# Patient Record
Sex: MALE | Race: WHITE | NOT HISPANIC OR LATINO | URBAN - METROPOLITAN AREA
[De-identification: names, ages, dates, MRNs, and addresses within clinical notes are randomized per-mention and may not be internally consistent; named-entity substitution may affect disease eponyms.]

---

## 2022-11-03 ENCOUNTER — EMERGENCY (EMERGENCY)
Facility: HOSPITAL | Age: 72
LOS: 1 days | Discharge: HOME | End: 2022-11-03
Attending: EMERGENCY MEDICINE | Admitting: EMERGENCY MEDICINE

## 2022-11-03 VITALS
TEMPERATURE: 98 F | SYSTOLIC BLOOD PRESSURE: 110 MMHG | OXYGEN SATURATION: 96 % | RESPIRATION RATE: 18 BRPM | DIASTOLIC BLOOD PRESSURE: 62 MMHG | HEART RATE: 50 BPM

## 2022-11-03 VITALS
SYSTOLIC BLOOD PRESSURE: 131 MMHG | RESPIRATION RATE: 18 BRPM | OXYGEN SATURATION: 96 % | HEART RATE: 53 BPM | DIASTOLIC BLOOD PRESSURE: 69 MMHG | WEIGHT: 192.02 LBS | TEMPERATURE: 98 F

## 2022-11-03 DIAGNOSIS — Z23 ENCOUNTER FOR IMMUNIZATION: ICD-10-CM

## 2022-11-03 DIAGNOSIS — S01.81XA LACERATION WITHOUT FOREIGN BODY OF OTHER PART OF HEAD, INITIAL ENCOUNTER: ICD-10-CM

## 2022-11-03 DIAGNOSIS — R51.9 HEADACHE, UNSPECIFIED: ICD-10-CM

## 2022-11-03 DIAGNOSIS — Z88.8 ALLERGY STATUS TO OTHER DRUGS, MEDICAMENTS AND BIOLOGICAL SUBSTANCES: ICD-10-CM

## 2022-11-03 DIAGNOSIS — I48.0 PAROXYSMAL ATRIAL FIBRILLATION: ICD-10-CM

## 2022-11-03 DIAGNOSIS — S02.2XXA FRACTURE OF NASAL BONES, INITIAL ENCOUNTER FOR CLOSED FRACTURE: ICD-10-CM

## 2022-11-03 DIAGNOSIS — S01.21XA LACERATION WITHOUT FOREIGN BODY OF NOSE, INITIAL ENCOUNTER: ICD-10-CM
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
## 2022-11-03 PROCEDURE — 99284 EMERGENCY DEPT VISIT MOD MDM: CPT

## 2022-11-03 PROCEDURE — 70486 CT MAXILLOFACIAL W/O DYE: CPT | Mod: 26,MH

## 2022-11-03 PROCEDURE — 70450 CT HEAD/BRAIN W/O DYE: CPT | Mod: 26,MH

## 2022-11-03 RX ORDER — TETANUS TOXOID, REDUCED DIPHTHERIA TOXOID AND ACELLULAR PERTUSSIS VACCINE, ADSORBED 5; 2.5; 8; 8; 2.5 [IU]/.5ML; [IU]/.5ML; UG/.5ML; UG/.5ML; UG/.5ML
0.5 SUSPENSION INTRAMUSCULAR ONCE
Refills: 0 | Status: COMPLETED | OUTPATIENT
Start: 2022-11-03 | End: 2022-11-03

## 2022-11-03 RX ADMIN — TETANUS TOXOID, REDUCED DIPHTHERIA TOXOID AND ACELLULAR PERTUSSIS VACCINE, ADSORBED 0.5 MILLILITER(S): 5; 2.5; 8; 8; 2.5 SUSPENSION INTRAMUSCULAR at 21:13

## 2022-11-03 RX ADMIN — Medication 1 TABLET(S): at 23:24

## 2022-11-03 NOTE — ED PROVIDER NOTE - NSFOLLOWUPINSTRUCTIONS_ED_ALL_ED_FT
A nasal fracture is a break or crack in the bones of the nose or the tissue that helps to form the nose (cartilage). Minor breaks do not require treatment and usually heal on their own after about one month. Serious breaks may require treatment that could include surgery.    What are the causes?  A nasal fracture is usually caused by the strong force of a direct hit to the nose (blunt injury). This type of injury often occurs from:    Playing a contact sport.  Being involved in a car accident.  Falling.  Getting punched.    What are the signs or symptoms?  Symptoms of this condition include:    Pain.  Swelling of the nose.  Bleeding from the nose.  Bruising around the nose or bruising around the eyes (black eyes).  Crooked appearance of the nose.    How is this diagnosed?  This condition may be diagnosed based on a physical exam. During the exam, the health care provider will:    Gently feel the nose for signs of broken bones.  Look inside the nostrils to check if there is a blood-filled swelling on the dividing wall between the nostrils (septal hematoma).    An X-ray of the nose may be taken. Sometimes, an X-ray may not show a nasal fracture even when one is present. In some cases, X-rays or a CT scan may be taken again 1–5 days later after the swelling has gone down.    How is this treated?  Treatment for this condition depends on the severity of the injury.    Minor fractures that have not caused deformity often do not require treatment.  For more serious fractures that have caused bones to move out of position, treatment may involve one of the following:    Repositioning the bones without surgery. The health care provider may be able to do this in his or her office after you are given medicine to numb the nasal area (local anesthetic).  Surgery. If surgery is needed, it will be done after the swelling is gone. Surgery will stabilize and align the fracture.    Follow these instructions at home:          Activity    Return to your normal activities as told by your health care provider. Ask your health care provider what activities are safe for you.  Avoid contact sports for 3–4 weeks or as told by your health care provider.        General instructions    If directed, put ice on the injured area:    Put ice in a plastic bag.  Place a towel between your skin and the bag.  Leave the ice on for 20 minutes, 2–3 times a day.  Take over-the-counter and prescription medicines only as told by your health care provider.  If your nose starts to bleed, sit in an upright position while you squeeze the soft parts of your nose against the dividing wall between your nostrils (septum) for 10 minutes.  Try to avoid blowing your nose.  Keep all follow-up visits as told by your health care provider. This is important.    Contact a health care provider if:  Your pain increases or becomes severe.  You continue to have nosebleeds.  The shape of your nose does not return to normal within 5 days.  You have pus draining out of your nose.    Get help right away if:  You have bleeding from your nose that does not stop after you pinch your nostrils closed for 20 minutes and keep ice on your nose.  You have clear fluid draining out of your nose.  You notice swelling near the septum inside the nose. This swelling is a septal hematoma that must be drained to help prevent infection.  You have difficulty moving your eyes.  You have repeated vomiting.    Summary  A nasal fracture is a break or crack in the bones or cartilage of the nose.  The fracture is usually caused by a blunt injury to the nose.  Symptoms include pain, swelling, and facial bruising.  Nasal fractures may heal on their own, or your health care provider may need to move the bones back into proper position. In some cases, surgery may be needed.    Some cuts, wounds, lacerations, and incisions can be repaired by using tissue adhesive, also called skin glue. It holds the skin together so healing can happen faster. It forms a strong bond on the skin in about 1 minute, and it reaches its full strength in about 2–3 minutes. The adhesive disappears naturally while the wound is healing. It is important to take good care of your wound at home while it heals.    Follow these instructions at home:      Wound care      If a bandage (dressing) has been applied, keep it clean and dry.  Follow instructions from your health care provider about how often to change the dressing.    Wash your hands with soap and water for at least 20 seconds before and after you change your dressing. If soap and water are not available, use hand .  Change your dressing as told by your health care provider.  Leave tissue adhesive in place. It will come off naturally after 7–10 days.  Do not scratch, rub, or pick at the adhesive.  Do not place tape over the adhesive. The adhesive could come off the wound when you pull the tape off.  Protect the wound from further injury until it is healed.  Check your wound area every day for signs of infection. Check for:    More redness, swelling, or pain.  Fluid or blood.  Warmth.  Pus or a bad smell.        Bathing    Do not take baths, swim, or use a hot tub until your health care provider approves. You may only be allowed to take sponge baths. Ask your health care provider if you may take showers.    You can usually shower after the first 24 hours.  Cover the dressing with a watertight covering when you take a shower.  Do not soak the area where there is tissue adhesive.  Do not use any soaps, petroleum jelly products, or ointments on the wound. Certain ointments can weaken the adhesive.        Eating and drinking    Eat a diet that includes protein, vitamin A, vitamin C, and other nutrients to help the wound heal. As told by your health care provider, eat a diet that contains food rich in:    Protein. These include meat, fish, eggs, dairy, beans, and nuts.  Vitamin A. These include carrots and dark green, leafy vegetables.  Vitamin C. These include citrus fruits, tomatoes, broccoli, and peppers.  Drink enough fluid to keep your urine pale yellow.        General instructions    Protect your wound from the sun when you are outside for the first 6 months, or for as long as told by your health care provider. Apply sunscreen with an SPF of 30 or higher around the scar, or cover it up.  Take over-the-counter and prescription medicines only as told by your health care provider.  Do not use any products that contain nicotine or tobacco, such as cigarettes, e-cigarettes, and chewing tobacco. These can delay wound healing. If you need help quitting, ask your health care provider.  Keep all follow-up visits as told by your health care provider. This is important.    Contact a health care provider if:  The tissue adhesive becomes soaked with blood or falls off before your wound has healed. The adhesive may need to be replaced.  You have a fever or chills.    Get help right away if:  Your wound reopens.  You have any of these signs of infection:    More redness, swelling, or pain around the wound.  A red streak at the area around the wound.  Fluid or blood coming from the wound.  Warmth coming from the wound.  Pus or a bad smell coming from the wound.  You develop a rash after the adhesive is applied.    Summary  The adhesive disappears naturally while the wound is healing. It is important to take good care of your wound at home while it heals.  Always wash your hands for at least 20 seconds with soap and water before and after changing your bandage (dressing).  To help with healing, eat foods that are rich in protein, vitamin A, vitamin C, and other nutrients.  Check your wound area every day for signs of infection. Get help right away if you suspect that your wound is infected.

## 2022-11-03 NOTE — ED PROVIDER NOTE - OBJECTIVE STATEMENT
\Pt states that he was in physical therapy when a heavy wooden bar fell from a machine and struck him in the face.  He doesn't think he had a LOC.  He sustained a superficial laceration over the bridge of his nose.  He complains of nasal pain and a mild headache.  No Amnesia, nausea or vomiting.  No dizziness.  He is on Xarelto for AF.

## 2022-11-03 NOTE — ED PROVIDER NOTE - PATIENT PORTAL LINK FT
You can access the FollowMyHealth Patient Portal offered by [facility] by registering at the following website: [email or web address]. By joining [facility]’s FollowMyHealth portal, you will also be able to view your health information using other applications (apps) compatible with our system.

## 2022-11-03 NOTE — ED ADULT NURSE NOTE - OBJECTIVE STATEMENT
Patient presents with laceration across his nose bridge after a bar hit his nose during physical therapy (patient doing PT for shoulder injury from recent fall). Patient is alert and oriented x 3. Denies nausea, vomiting, vision change, headache, SOB, CP, dizziness.

## 2022-11-03 NOTE — ED PROVIDER NOTE - CARE PROVIDER_API CALL
Natalie Yadav)  Plastic Surgery; Surgery  [address] [place] [id number]  Phone: [phone]  Fax: [phone]  Follow Up Time: 1-3 Days

## 2022-11-03 NOTE — ED PROVIDER NOTE - ENMT, MLM
Airway patent, No scalp hematoma or other signs of trauma.  Superficial curvilinear laceration over bridge of nose without active bleeding.  Tenderness but no deformity.  No septal hematoma.  No other facial bony tenderness noted.

## 2022-11-03 NOTE — ED ADULT TRIAGE NOTE - CHIEF COMPLAINT QUOTE
Pt complaining of head injury. Pt states that he was in physical therapy when a bar hit him in the face. Pt with laceration across the bridge of his nose. Pt denies loc. Currently on xarelto. Of note, pt with recent fall.

## 2022-11-03 NOTE — ED PROVIDER NOTE - CLINICAL SUMMARY MEDICAL DECISION MAKING FREE TEXT BOX
Pt with blunt trauma to face, currently on Xarelto.  CT head WNL & neuro exam unremarkable, but CT facial bones reveal nondisplaced nasal bone fracture.  Lac repaired with Dermabond.